# Patient Record
Sex: FEMALE | Race: WHITE | Employment: FULL TIME | URBAN - METROPOLITAN AREA
[De-identification: names, ages, dates, MRNs, and addresses within clinical notes are randomized per-mention and may not be internally consistent; named-entity substitution may affect disease eponyms.]

---

## 2019-04-12 ENCOUNTER — HOSPITAL ENCOUNTER (OUTPATIENT)
Dept: MRI IMAGING | Age: 24
Discharge: HOME | End: 2019-04-12
Attending: PODIATRIST
Payer: COMMERCIAL

## 2019-04-12 DIAGNOSIS — M95.8 OSTEOCHONDRAL DEFECT OF ANKLE: ICD-10-CM

## 2019-04-12 PROCEDURE — 73721 MRI JNT OF LWR EXTRE W/O DYE: CPT

## 2019-05-21 RX ORDER — HYOSCYAMINE SULFATE 0.12 MG/1
0.12 TABLET, ORALLY DISINTEGRATING ORAL
COMMUNITY

## 2019-05-21 RX ORDER — LANSOPRAZOLE 30 MG/1
30 CAPSULE, DELAYED RELEASE ORAL
COMMUNITY

## 2019-05-21 RX ORDER — ONDANSETRON HYDROCHLORIDE 8 MG/1
8 TABLET, FILM COATED ORAL
COMMUNITY

## 2019-05-21 RX ORDER — RANITIDINE 150 MG/1
150 TABLET, FILM COATED ORAL DAILY
COMMUNITY

## 2019-05-21 RX ORDER — ASPIRIN 81 MG/1
81 TABLET ORAL DAILY
COMMUNITY

## 2019-05-21 RX ORDER — NORETHINDRONE ACETATE AND ETHINYL ESTRADIOL 1; .02 MG/1; MG/1
1 TABLET ORAL DAILY
COMMUNITY

## 2019-05-21 NOTE — PERIOP NOTES
[facility]  Preoperative Instructions        Surgery Date 5/24/2019          Time of Arrival 0600    1. On the day of your surgery, please report to the Surgical Services Registration Desk and sign in at your designated time. The Surgery Center is located to the right of the Emergency Room. 2. You must have someone with you to drive you home. You should not drive a car for 24 hours following surgery. Please make arrangements for a friend or family member to stay with you for the first 24 hours after your surgery. 3. Do not have anything to eat or drink (including water, gum, mints, coffee, juice) after midnight 5/23/2019. ? This may not apply to medications prescribed by your physician. ?(Please note below the special instructions with medications to take the morning of your procedure.)    4. We recommend you do not drink any alcoholic beverages for 24 hours before and after your surgery. 5. Contact your surgeons office for instructions on the following medications: non-steroidal anti-inflammatory drugs (i.e. Advil, Aleve), vitamins, and supplements. (Some surgeons will want you to stop these medications prior to surgery and others may allow you to take them)  **If you are currently taking Plavix, Coumadin, Aspirin and/or other blood-thinning agents, contact your surgeon for instructions. ** Your surgeon will partner with the physician prescribing these medications to determine if it is safe to stop or if you need to continue taking. Please do not stop taking these medications without instructions from your surgeon    6. Wear comfortable clothes. Wear glasses instead of contacts. Do not bring any money or jewelry. Please bring picture ID, insurance card, and any prearranged co-payment or hospital payment. Do not wear make-up, particularly mascara the morning of your surgery. Do not wear nail polish, particularly if you are having foot /hand surgery.   Wear your hair loose or down, no ponytails, buns, millie pins or clips. All body piercings must be removed. Please shower with antibacterial soap for three consecutive days before and on the morning of surgery, but do not apply any lotions, powders or deodorants after the shower on the day of surgery. Please use a fresh towels after each shower. Please sleep in clean clothes and change bed linens the night before surgery. Please do not shave for 48 hours prior to surgery. Shaving of the face is acceptable. 7. You should understand that if you do not follow these instructions your surgery may be cancelled. If your physical condition changes (I.e. fever, cold or flu) please contact your surgeon as soon as possible. 8. It is important that you be on time. If a situation occurs where you may be late, please call [phone] (OR Holding Area). 9. If you have any questions and or problems, please call [phone] (Pre-admission Testing). 10. Your surgery time may be subject to change. You will receive a phone call the evening prior if your time changes. 11.  If having outpatient surgery, you must have someone to drive you here, stay with you during the duration of your stay, and to drive you home at time of discharge. 12.   In an effort to improve the efficiency, privacy, and safety for all of our Pre-op patients visitors are not allowed in the Holding area. Once you arrive and are registered your family/visitors will be asked to remain in the waiting room. The Pre-op staff will get you from the Surgical Waiting Area and will explain to you and your family/visitors that the Pre-op phase is beginning. The staff will answer any questions and provide instructions for tracking of the patient, by use of the existing tracking number and color-coded status board in the waiting room.   At this time the staff will also ask for your designated spokesperson information in the event that the physician or staff need to provide an update or obtain any pertinent information. The designated spokesperson will be notified if the physician needs to speak to family during the pre-operative phase. If at any time your family/visitors has questions or concerns they may approach the volunteer desk in the waiting area for assistance. Special Instructions: bring walking boot/crutches. MEDICATIONS TO TAKE THE MORNING OF SURGERY WITH A SIP OF WATER:  Zyrtec, Hyoscyamin, Prevacid, Junel, Zofran, Zantac, Nasacort. I understand a pre-operative phone call will be made to verify my surgery time. In the event that I am not available, I give permission for a message to be left on my answering service and/or with another person?   Yes, patient at [phone].         ___________________      __________   _________    Esequiel Pascal of Patient)             (Witness)                (Date and Time)

## 2019-05-21 NOTE — PERIOP NOTES
Pt states,\" I took my last dose of Eliquis this morning, and I will start Aspirin 81 mg once a day, that is what my doctor told me to do because I have a DVT in my left leg. He told me to take it everyday and even on the day of my surgery because of the DVT\". Advised pt to verify if she should take Aspirin on DOS.   Pt states she will recheck with MD.

## 2019-05-23 ENCOUNTER — ANESTHESIA EVENT (OUTPATIENT)
Dept: SURGERY | Age: 24
End: 2019-05-23
Payer: COMMERCIAL

## 2019-05-23 NOTE — PERIOP NOTES
Spoke to the patient and gave TOA of 730 and to come to [facility]. Reviewed pre op instructions. Patient verbalized understanding.

## 2019-05-24 ENCOUNTER — HOSPITAL ENCOUNTER (OUTPATIENT)
Age: 24
Setting detail: OUTPATIENT SURGERY
Discharge: HOME | End: 2019-05-24
Attending: PODIATRIST | Admitting: PODIATRIST
Payer: COMMERCIAL

## 2019-05-24 ENCOUNTER — ANESTHESIA (OUTPATIENT)
Dept: SURGERY | Age: 24
End: 2019-05-24
Payer: COMMERCIAL

## 2019-05-24 VITALS
HEIGHT: 61 IN | SYSTOLIC BLOOD PRESSURE: 123 MMHG | TEMPERATURE: 99.2 F | WEIGHT: 180 LBS | HEART RATE: 100 BPM | BODY MASS INDEX: 33.99 KG/M2 | OXYGEN SATURATION: 97 % | RESPIRATION RATE: 17 BRPM | DIASTOLIC BLOOD PRESSURE: 79 MMHG

## 2019-05-24 DIAGNOSIS — G89.18 POST-OP PAIN: Primary | ICD-10-CM

## 2019-05-24 LAB — HCG UR QL: NEGATIVE

## 2019-05-24 PROCEDURE — 74011250636 HC RX REV CODE- 250/636

## 2019-05-24 PROCEDURE — 76210000034 HC AMBSU PH I REC 0.5 TO 1 HR: Performed by: PODIATRIST

## 2019-05-24 PROCEDURE — 77030008684 HC TU ET CUF COVD -B: Performed by: NURSE ANESTHETIST, CERTIFIED REGISTERED

## 2019-05-24 PROCEDURE — 77030006898 HC BLD SHV SYNVTR S&N -B: Performed by: PODIATRIST

## 2019-05-24 PROCEDURE — 74011250636 HC RX REV CODE- 250/636: Performed by: ANESTHESIOLOGY

## 2019-05-24 PROCEDURE — 81025 URINE PREGNANCY TEST: CPT

## 2019-05-24 PROCEDURE — 74011000250 HC RX REV CODE- 250: Performed by: PODIATRIST

## 2019-05-24 PROCEDURE — 76060000063 HC AMB SURG ANES 1.5 TO 2 HR: Performed by: PODIATRIST

## 2019-05-24 PROCEDURE — 74011250636 HC RX REV CODE- 250/636: Performed by: PODIATRIST

## 2019-05-24 PROCEDURE — 77030026438 HC STYL ET INTUB CARD -A: Performed by: NURSE ANESTHETIST, CERTIFIED REGISTERED

## 2019-05-24 PROCEDURE — C1713 ANCHOR/SCREW BN/BN,TIS/BN: HCPCS | Performed by: PODIATRIST

## 2019-05-24 PROCEDURE — 74011250637 HC RX REV CODE- 250/637: Performed by: ANESTHESIOLOGY

## 2019-05-24 PROCEDURE — 77030003397 HC NDL ARTHSCP ARTH -C: Performed by: PODIATRIST

## 2019-05-24 PROCEDURE — 77030003666 HC NDL SPINAL BD -A: Performed by: PODIATRIST

## 2019-05-24 PROCEDURE — 77030018835 HC SOL IRR LR ICUM -A: Performed by: PODIATRIST

## 2019-05-24 PROCEDURE — 77030012711 HC WND ARTHRO ABLT S&N -D: Performed by: PODIATRIST

## 2019-05-24 PROCEDURE — 77030002916 HC SUT ETHLN J&J -A: Performed by: PODIATRIST

## 2019-05-24 PROCEDURE — 77030019908 HC STETH ESOPH SIMS -A: Performed by: NURSE ANESTHETIST, CERTIFIED REGISTERED

## 2019-05-24 PROCEDURE — 77030008496 HC TBNG ARTHSC IRR S&N -B: Performed by: PODIATRIST

## 2019-05-24 PROCEDURE — 76210000056 HC AMBSU PH II REC 1.5 TO 2 HR: Performed by: PODIATRIST

## 2019-05-24 PROCEDURE — 76030000003 HC AMB SURG OR TIME 1.5 TO 2: Performed by: PODIATRIST

## 2019-05-24 PROCEDURE — 77030018850 HC STOCK ANTIEMB THG COVD -A: Performed by: PODIATRIST

## 2019-05-24 PROCEDURE — 77030020255 HC SOL INJ LR 1000ML BG: Performed by: PODIATRIST

## 2019-05-24 DEVICE — KIT INSTR INTERNALBRACE LIG AUG REP CLLGN COAT FBR TAPE W/: Type: IMPLANTABLE DEVICE | Site: ANKLE | Status: FUNCTIONAL

## 2019-05-24 RX ORDER — HYDROMORPHONE HYDROCHLORIDE 1 MG/ML
INJECTION, SOLUTION INTRAMUSCULAR; INTRAVENOUS; SUBCUTANEOUS
Status: DISCONTINUED
Start: 2019-05-24 | End: 2019-05-24 | Stop reason: HOSPADM

## 2019-05-24 RX ORDER — SODIUM CHLORIDE, SODIUM LACTATE, POTASSIUM CHLORIDE, CALCIUM CHLORIDE 600; 310; 30; 20 MG/100ML; MG/100ML; MG/100ML; MG/100ML
25 INJECTION, SOLUTION INTRAVENOUS CONTINUOUS
Status: DISCONTINUED | OUTPATIENT
Start: 2019-05-24 | End: 2019-05-24 | Stop reason: HOSPADM

## 2019-05-24 RX ORDER — LIDOCAINE HYDROCHLORIDE 10 MG/ML
0.1 INJECTION, SOLUTION EPIDURAL; INFILTRATION; INTRACAUDAL; PERINEURAL AS NEEDED
Status: DISCONTINUED | OUTPATIENT
Start: 2019-05-24 | End: 2019-05-24 | Stop reason: HOSPADM

## 2019-05-24 RX ORDER — SODIUM CHLORIDE 0.9 % (FLUSH) 0.9 %
5-40 SYRINGE (ML) INJECTION AS NEEDED
Status: DISCONTINUED | OUTPATIENT
Start: 2019-05-24 | End: 2019-05-24 | Stop reason: HOSPADM

## 2019-05-24 RX ORDER — SUCCINYLCHOLINE CHLORIDE 20 MG/ML
INJECTION INTRAMUSCULAR; INTRAVENOUS AS NEEDED
Status: DISCONTINUED | OUTPATIENT
Start: 2019-05-24 | End: 2019-05-24 | Stop reason: HOSPADM

## 2019-05-24 RX ORDER — DEXAMETHASONE SODIUM PHOSPHATE 4 MG/ML
INJECTION, SOLUTION INTRA-ARTICULAR; INTRALESIONAL; INTRAMUSCULAR; INTRAVENOUS; SOFT TISSUE AS NEEDED
Status: DISCONTINUED | OUTPATIENT
Start: 2019-05-24 | End: 2019-05-24 | Stop reason: HOSPADM

## 2019-05-24 RX ORDER — SODIUM CHLORIDE 0.9 % (FLUSH) 0.9 %
5-40 SYRINGE (ML) INJECTION EVERY 8 HOURS
Status: DISCONTINUED | OUTPATIENT
Start: 2019-05-24 | End: 2019-05-24 | Stop reason: HOSPADM

## 2019-05-24 RX ORDER — OXYCODONE AND ACETAMINOPHEN 7.5; 325 MG/1; MG/1
1 TABLET ORAL
Qty: 30 TAB | Refills: 0 | Status: SHIPPED | OUTPATIENT
Start: 2019-05-24 | End: 2019-05-27

## 2019-05-24 RX ORDER — FENTANYL CITRATE 50 UG/ML
INJECTION, SOLUTION INTRAMUSCULAR; INTRAVENOUS AS NEEDED
Status: DISCONTINUED | OUTPATIENT
Start: 2019-05-24 | End: 2019-05-24 | Stop reason: HOSPADM

## 2019-05-24 RX ORDER — PROPOFOL 10 MG/ML
INJECTION, EMULSION INTRAVENOUS AS NEEDED
Status: DISCONTINUED | OUTPATIENT
Start: 2019-05-24 | End: 2019-05-24 | Stop reason: HOSPADM

## 2019-05-24 RX ORDER — CEFAZOLIN SODIUM/WATER 2 G/20 ML
SYRINGE (ML) INTRAVENOUS
Status: COMPLETED
Start: 2019-05-24 | End: 2019-05-24

## 2019-05-24 RX ORDER — ONDANSETRON 2 MG/ML
INJECTION INTRAMUSCULAR; INTRAVENOUS AS NEEDED
Status: DISCONTINUED | OUTPATIENT
Start: 2019-05-24 | End: 2019-05-24 | Stop reason: HOSPADM

## 2019-05-24 RX ORDER — CEFAZOLIN SODIUM/WATER 2 G/20 ML
2 SYRINGE (ML) INTRAVENOUS
Status: COMPLETED | OUTPATIENT
Start: 2019-05-24 | End: 2019-05-24

## 2019-05-24 RX ORDER — DIPHENHYDRAMINE HYDROCHLORIDE 50 MG/ML
12.5 INJECTION, SOLUTION INTRAMUSCULAR; INTRAVENOUS AS NEEDED
Status: DISCONTINUED | OUTPATIENT
Start: 2019-05-24 | End: 2019-05-24 | Stop reason: HOSPADM

## 2019-05-24 RX ORDER — OXYCODONE AND ACETAMINOPHEN 5; 325 MG/1; MG/1
1 TABLET ORAL
Status: DISCONTINUED | OUTPATIENT
Start: 2019-05-24 | End: 2019-05-24 | Stop reason: HOSPADM

## 2019-05-24 RX ORDER — MORPHINE SULFATE 10 MG/ML
2 INJECTION, SOLUTION INTRAMUSCULAR; INTRAVENOUS
Status: DISCONTINUED | OUTPATIENT
Start: 2019-05-24 | End: 2019-05-24 | Stop reason: HOSPADM

## 2019-05-24 RX ORDER — BUPIVACAINE HYDROCHLORIDE AND EPINEPHRINE 5; 5 MG/ML; UG/ML
INJECTION, SOLUTION EPIDURAL; INTRACAUDAL; PERINEURAL AS NEEDED
Status: DISCONTINUED | OUTPATIENT
Start: 2019-05-24 | End: 2019-05-24 | Stop reason: HOSPADM

## 2019-05-24 RX ORDER — PROMETHAZINE HYDROCHLORIDE 25 MG/1
25 TABLET ORAL
Qty: 25 TAB | Refills: 0 | Status: SHIPPED | OUTPATIENT
Start: 2019-05-24

## 2019-05-24 RX ORDER — HYDROMORPHONE HYDROCHLORIDE 2 MG/ML
INJECTION, SOLUTION INTRAMUSCULAR; INTRAVENOUS; SUBCUTANEOUS AS NEEDED
Status: DISCONTINUED | OUTPATIENT
Start: 2019-05-24 | End: 2019-05-24 | Stop reason: HOSPADM

## 2019-05-24 RX ORDER — HYDROMORPHONE HYDROCHLORIDE 1 MG/ML
.2-.5 INJECTION, SOLUTION INTRAMUSCULAR; INTRAVENOUS; SUBCUTANEOUS ONCE
Status: DISCONTINUED | OUTPATIENT
Start: 2019-05-24 | End: 2019-05-24 | Stop reason: HOSPADM

## 2019-05-24 RX ORDER — MIDAZOLAM HYDROCHLORIDE 1 MG/ML
INJECTION, SOLUTION INTRAMUSCULAR; INTRAVENOUS AS NEEDED
Status: DISCONTINUED | OUTPATIENT
Start: 2019-05-24 | End: 2019-05-24 | Stop reason: HOSPADM

## 2019-05-24 RX ORDER — LIDOCAINE HYDROCHLORIDE 20 MG/ML
INJECTION, SOLUTION EPIDURAL; INFILTRATION; INTRACAUDAL; PERINEURAL AS NEEDED
Status: DISCONTINUED | OUTPATIENT
Start: 2019-05-24 | End: 2019-05-24 | Stop reason: HOSPADM

## 2019-05-24 RX ORDER — ACETAMINOPHEN 10 MG/ML
1000 INJECTION, SOLUTION INTRAVENOUS ONCE
Status: COMPLETED | OUTPATIENT
Start: 2019-05-24 | End: 2019-05-24

## 2019-05-24 RX ORDER — SCOLOPAMINE TRANSDERMAL SYSTEM 1 MG/1
1 PATCH, EXTENDED RELEASE TRANSDERMAL ONCE
Status: DISCONTINUED | OUTPATIENT
Start: 2019-05-24 | End: 2019-05-24 | Stop reason: HOSPADM

## 2019-05-24 RX ORDER — FENTANYL CITRATE 50 UG/ML
25 INJECTION, SOLUTION INTRAMUSCULAR; INTRAVENOUS
Status: DISCONTINUED | OUTPATIENT
Start: 2019-05-24 | End: 2019-05-24 | Stop reason: HOSPADM

## 2019-05-24 RX ADMIN — Medication 2 G: at 09:41

## 2019-05-24 RX ADMIN — DEXAMETHASONE SODIUM PHOSPHATE 4 MG: 4 INJECTION, SOLUTION INTRA-ARTICULAR; INTRALESIONAL; INTRAMUSCULAR; INTRAVENOUS; SOFT TISSUE at 09:45

## 2019-05-24 RX ADMIN — MIDAZOLAM HYDROCHLORIDE 2 MG: 1 INJECTION, SOLUTION INTRAMUSCULAR; INTRAVENOUS at 09:28

## 2019-05-24 RX ADMIN — MEPERIDINE HYDROCHLORIDE 12.5 MG: 25 INJECTION, SOLUTION INTRAMUSCULAR; INTRAVENOUS; SUBCUTANEOUS at 11:41

## 2019-05-24 RX ADMIN — FENTANYL CITRATE 25 MCG: 50 INJECTION, SOLUTION INTRAMUSCULAR; INTRAVENOUS at 10:54

## 2019-05-24 RX ADMIN — FENTANYL CITRATE 25 MCG: 50 INJECTION, SOLUTION INTRAMUSCULAR; INTRAVENOUS at 10:56

## 2019-05-24 RX ADMIN — SUCCINYLCHOLINE CHLORIDE 140 MG: 20 INJECTION INTRAMUSCULAR; INTRAVENOUS at 09:34

## 2019-05-24 RX ADMIN — SODIUM CHLORIDE, SODIUM LACTATE, POTASSIUM CHLORIDE, AND CALCIUM CHLORIDE 25 ML/HR: 600; 310; 30; 20 INJECTION, SOLUTION INTRAVENOUS at 11:59

## 2019-05-24 RX ADMIN — MEPERIDINE HYDROCHLORIDE 12.5 MG: 25 INJECTION, SOLUTION INTRAMUSCULAR; INTRAVENOUS; SUBCUTANEOUS at 11:28

## 2019-05-24 RX ADMIN — LIDOCAINE HYDROCHLORIDE 60 MG: 20 INJECTION, SOLUTION EPIDURAL; INFILTRATION; INTRACAUDAL; PERINEURAL at 09:34

## 2019-05-24 RX ADMIN — ACETAMINOPHEN 1000 MG: 10 INJECTION, SOLUTION INTRAVENOUS at 08:26

## 2019-05-24 RX ADMIN — HYDROMORPHONE HYDROCHLORIDE 0.5 MG: 2 INJECTION, SOLUTION INTRAMUSCULAR; INTRAVENOUS; SUBCUTANEOUS at 09:27

## 2019-05-24 RX ADMIN — PROPOFOL 250 MG: 10 INJECTION, EMULSION INTRAVENOUS at 09:34

## 2019-05-24 RX ADMIN — SODIUM CHLORIDE, SODIUM LACTATE, POTASSIUM CHLORIDE, AND CALCIUM CHLORIDE 25 ML/HR: 600; 310; 30; 20 INJECTION, SOLUTION INTRAVENOUS at 08:18

## 2019-05-24 RX ADMIN — FENTANYL CITRATE 50 MCG: 50 INJECTION, SOLUTION INTRAMUSCULAR; INTRAVENOUS at 09:58

## 2019-05-24 RX ADMIN — ONDANSETRON 4 MG: 2 INJECTION INTRAMUSCULAR; INTRAVENOUS at 09:27

## 2019-05-24 RX ADMIN — HYDROMORPHONE HYDROCHLORIDE 0.5 MG: 2 INJECTION, SOLUTION INTRAMUSCULAR; INTRAVENOUS; SUBCUTANEOUS at 09:43

## 2019-05-24 NOTE — PERIOP NOTES
Permission received to review discharge instructions and discuss private health information with Sienna Perez. Adi Dozier brought to bedside. 1249-Pt tolerating liquids, Adi Dozier updated on pt's status. VSS. D/c instructions reviewed, with fiance, all questions answered. Reviewed hygiene care, when to take pain meds, when to call the doctor, when to call 911, how to do leg exercises, when to restart eliquis, how to use the sequential machine at home. Pt given a work note. 1314-Pt resting,tolerating water w/o difficulty. VSS. Pt receiving more iv fluids due to dizziness and nausea when sitting up. Pt given quease ease, on oxygen and receiving iv fluids. 1345- Pt voided x 1, on and off nausea while getting dressed. Placed sequential for home use, not working properly so will use other one from rep once in vehicle. 1354-Transported pt via w/c to awaiting transportation. Discussed to try other sequential , if continues to beep, go by Dr Gupta Carrier office and switch out since pt lives approximately 2 hours away. Pt given vomit bags and extra quease ease. Pt and fiance informed to check with pcp about aspirin resumption tomorrow since pcp had started pt  On aspirin since pt could not take elequis. Pt given crackers and encouraged to keep diet light on way home. Pt reporting pain is minimal in L ankle.

## 2019-05-24 NOTE — BRIEF OP NOTE
BRIEF OPERATIVE NOTE    Date of Procedure: 5/24/2019   Preoperative Diagnosis: CAPSULITIS LEFT ANKLE  Postoperative Diagnosis: CAPSULITIS LEFT ANKLE LOOSE BODY AND ANKLE INSTABILITY   Procedure(s):  ARTHROSCOPIC DEBRIDEMENT LEFT ANKLE, arthroscopic stabilization of  left ankle   Surgeon(s) and Role:     Daniel Elizondo DPM - Primary         Surgical Assistant: none    Surgical Staff:  Circ-1: Karon Kruse RN  Scrub Tech-1: Abdi Vicente  Event Time In Time Out   Incision Start 0540    Incision Close 1106      Anesthesia: General   Estimated Blood Loss: 5cc  Specimens: * No specimens in log *   Findings: unstable ankle and loose body   Complications: none  Implants:   Implant Name Type Inv.  Item Serial No.  Lot No. LRB No. Used Action   KIT LIGMNT REP AUG -- INTERNALBRACE - SNA  KIT LIGMNT REP AUG -- INTERNALBRACE NA ARTHREX [id number] 1 Implanted

## 2019-05-24 NOTE — ANESTHESIA POSTPROCEDURE EVALUATION
Procedure(s):  ARTHROSCOPIC DEBRIDEMENT LEFT ANKLE, arthroscopic stabilization of  left ankle .     general    Anesthesia Post Evaluation      Multimodal analgesia: multimodal analgesia used between 6 hours prior to anesthesia start to PACU discharge  Patient location during evaluation: bedside  Patient participation: complete - patient participated  Level of consciousness: awake and alert  Pain score: 2  Airway patency: patent  Anesthetic complications: no  Cardiovascular status: acceptable  Respiratory status: acceptable  Hydration status: acceptable  Post anesthesia nausea and vomiting:  controlled      Vitals Value Taken Time   /76 5/24/2019 12:10 PM   Temp 37.3 °C (99.2 °F) 5/24/2019 12:10 PM   Pulse 105 5/24/2019 12:10 PM   Resp 15 5/24/2019 12:10 PM   SpO2 99 % 5/24/2019 12:10 PM

## 2019-05-24 NOTE — OP NOTES
[address]  OPERATIVE REPORT    Name:  Cassi Sloan  MR#:  [id number]  :  1995  ACCOUNT #:  [de-identified]  DATE OF SERVICE:  2019    PREOPERATIVE DIAGNOSIS:  Capsulitis of the left ankle. POSTOPERATIVE DIAGNOSIS:  Capsulitis, left ankle with loose body and instability. PROCEDURE PERFORMED:  Arthroscopic debridement extensive in nature of the left ankle with arthroscopic ankle stabilization. SURGEON:  Danielle Arora DPM    ASSISTANT:  none. ANESTHESIA:  General.    COMPLICATIONS:  None. SPECIMENS REMOVED:  none. IMPLANTS:  Arthrex internal brace. ESTIMATED BLOOD LOSS:  5 mL. PATHOLOGY:  None. INDICATIONS:  I spoke to the patient about the procedure. She said she sprained her ankle a few times and it is getting worse and more unstable. We have agreed that we would go in there and inspect the ankle and debride it and then if there were any issues, I would stabilize it. When I went in there, I immediately noted there was an impingement lesion along the lateral aspect of the left ankle, this was debrided out. There was hypertrophy of the anterior and inferior tib-fib ligament, this was also debrided back to a normal healthy ligament. Once I had accomplished both of these two maneuvers, we did a stress inversion arthroscopically and noted an increase in inversion of the ankle. At the anterior talofibular ligament, there was a loose piece of bone probably from an avulsion from a previous sprain and this was excised out totally. At this point, I roughened up the lateral aspect of the fibula; I turned the internal brace into the fibula and then put the internal brace into the talus at the insertion of the anterior talofibular with a 3.5 anchor as well. This was stable. The inversion was repaired. I then took the remaining suture from the internal brace.   I did a Scorpion and grabbed the soft tissues from the lateral aspect of the ankle capsule and broached in the lateral capsule into the fibula with a [place] slider knot. Once I had created all of this, the ankle was cleaned. The portals were closed with a 4-0 nylon. She was injected with dexamethasone and Marcaine and left the operating room to the recovery room after application of a posterior splint.       MAMIE Mcbride/[id number]  D:  05/24/2019 11:26  T:  05/24/2019 12:03  JOB #:  [id number]

## 2019-05-24 NOTE — ANESTHESIA PREPROCEDURE EVALUATION
Relevant Problems   No relevant active problems       Anesthetic History     PONV          Review of Systems / Medical History  Patient summary reviewed, nursing notes reviewed and pertinent labs reviewed    Pulmonary                Comments: Sinus allergies   Neuro/Psych         Psychiatric history (anxiety)     Cardiovascular                  Exercise tolerance: >4 METS  Comments: Current DVT of left leg due to injury; stopped Eliquis 3 day ago   GI/Hepatic/Renal     GERD: poorly controlled      Liver disease (fatty)     Endo/Other        Obesity     Other Findings   Comments: Rare motion sickness & vertigo         Physical Exam    Airway  Mallampati: I  TM Distance: > 6 cm  Neck ROM: normal range of motion   Mouth opening: Normal     Cardiovascular    Rhythm: regular  Rate: normal         Dental  No notable dental hx       Pulmonary  Breath sounds clear to auscultation               Abdominal  GI exam deferred       Other Findings            Anesthetic Plan    ASA: 2  Anesthesia type: general          Induction: Intravenous  Anesthetic plan and risks discussed with: Patient      Scopolamine patch preop

## 2019-05-24 NOTE — PERIOP NOTES
Royce Suárez  1995  [id number]    Situation:  Verbal report given from: Marcell Jones CRNA, Gopal Hamm RN  Procedure: Procedure(s):  ARTHROSCOPIC DEBRIDEMENT LEFT ANKLE, arthroscopic stabilization of  left ankle     Background:    Preoperative diagnosis: CAPSULITIS LEFT ANKLE    Postoperative diagnosis: CAPSULITIS LEFT ANKLE    :  Dr. Nikhil Cancino    Assistant(s): Circ-1: Allyssa Santana RN  Scrub Tech-1: Arlette GARCIA    Specimens: * No specimens in log *    Assessment:  Intra-procedure medications         Anesthesia gave intra-procedure sedation and medications, see anesthesia flow sheet     Intravenous fluids: LR@ KVO     Vital signs stable       Recommendation:

## 2019-05-24 NOTE — DISCHARGE INSTRUCTIONS
>>>You received an IV form of Tylenol 1000mg (Ofirmev) during your surgery, you may take tylenol (or pain medication containing Tylenol or Acetaminophen) in 4 hours at 1230.<<<        INSTRUCTIONS FOLLOWING FOOT SURGERY    ACTIVITY:  · Elevate feet for 48 hours  · Use ice as directed by your doctor  · Use crutches / walker as directed by your doctor, and follow your doctors instructions as to your weight bearing status - NO WEIGHT ON THE SURGICAL SIDE    DIET:  · Clear liquids until no nausea or vomiting; then light diet for the first day  · An advance to regular diet on second day, unless your doctor orders otherwise. PAIN:  · Take pain medication as directed by your doctor. · Call your doctor if pain is not relieved by medication. · Do not take aspirin or blood thinners until directed by your doctor. DRESSING CARE: keep dressing clean and dry, do not remove       FOLLOW-UP PHONE CALLS:  · Calls will be made by nursing staff. · If you had any problems, call your doctor as needed. CALL YOUR DOCTOR IF:  · Excessive bleeding that does not stop after holding mild pressure over the area  · Temperature of 101° F or above  · Redness, excessive swelling or bruising, and/or green or yellow, smelly discharge from incision  · Loss of sensation -cold, white, or blue toes    AFTER ANESTHESIA :   · For the first 24 hours: do not drive, drink alcoholic beverages, or make important decisions. · Be aware of dizziness following anesthesia and while taking pain medication. DISCHARGE MEDICATIONS:         APPOINTMENT DATE/TIME: please call for an appointment    YOUR [address] PHONE NUMBER: [phone]    Patients signature:  Date: 5/24/2019  Discharging Nurse:       TO PREVENT AN INFECTION      1. [address] YOUR HANDS     To prevent infection, good handwashing is the most important thing you or your caregiver can do.        Wash your hands with soap and water or use the hand  we gave you before you touch any wounds. 2. SHOWER     Use the antibacterial soap we gave you when you take a shower.  Shower with this soap until your wounds are healed.  To reach all areas of your body, you may need someone to help you.  Dont forget to clean your belly button with every shower. 3.  USE CLEAN SHEETS     Use freshly cleaned sheets on your bed after surgery.  To keep the surgery site clean, do not allow pets to sleep with you while your wound is still healing. 4. STOP SMOKING     Stop smoking, or at least cut back on smoking     Smoking slows your healing. 5.  CONTROL YOUR BLOOD SUGAR     High blood sugars slow wound healing.  If you are diabetic, control your blood sugar levels before and after your surgery. DO NOT TAKE TYLENOL/ACETAMINOPHEN WITH PERCOCET, [facility]ORT[facility], [address] TAKE NARCOTIC PAIN MEDICATIONS WITH FOOD     Narcotics tend to be constipating, we suggest taking a stool softener such as Colace or Miralax (follow package instructions). DO NOT DRIVE WHILE TAKING NARCOTIC PAIN MEDICATIONS. DO NOT TAKE SLEEPING MEDICATIONS OR ANTIANXIETY MEDICATIONS WHILE TAKING NARCOTIC PAIN MEDICATIONS,  ESPECIALLY THE NIGHT OF ANESTHESIA! CPAP PATIENTS BE SURE TO WEAR MACHINE WHENEVER NAPPING OR SLEEPING! DISCHARGE SUMMARY from Nurse    The following personal items collected during your admission are returned to you:   Dental Appliance: Dental Appliances: None  Vision: Visual Aid: Glasses, With patient  Hearing Aid:    Jewelry: Jewelry: None  Clothing: Clothing: With patient, Shirt, Undergarments, Footwear, Pants  Other Valuables: Other Valuables: None  Valuables sent to safe:        PATIENT INSTRUCTIONS:    After General Anesthesia or Intravenous Sedation, for 24 hours or while taking prescription Narcotics:        Someone should be with you for the next 24 hours. For your own safety, a responsible adult must drive you home.   · Limit your activities  · Recommended activity: Rest today, up with assistance today. Do not climb stairs or shower unattended for the next 24 hours. · Please start with a soft bland diet and advance as tolerated (no nausea) to regular diet. · If you have a sore throat you should try the following: fluids, warm salt water gargles, or throat lozenges. If it does not improve after several days please follow up with your primary physician. · Do not drive and operate hazardous machinery  · Do not make important personal or business decisions  · Do  not drink alcoholic beverages  · If you have not urinated within 8 hours after discharge, please contact your surgeon on call. Report the following to your surgeon:  · Excessive pain, swelling, redness or odor of or around the surgical area  · Temperature over 100.5  · Nausea and vomiting lasting longer than 4 hours or if unable to take medications  · Any signs of decreased circulation or nerve impairment to extremity: change in color, persistent  numbness, tingling, coldness or increase pain      · You will receive a Post Operative Call from one of the Recovery Room Nurses on the day after your surgery to check on you. It is very important for us to know how you are recovering after your surgery. If you have an issue or need to speak with someone, please call your surgeon, do not wait for the post operative call. · You may receive an e-mail or letter in the mail from [place] regarding your experience with us in the Ambulatory Surgery Unit. Your feedback is valuable to us and we appreciate your participation in the survey. · If the above instructions are not adequate or you are having problems after your surgery, call the physician at their office number. · We wish you a speedy recovery ? What to do at Home:      *  Please give a list of your current medications to your Primary Care Provider.     *  Please update this list whenever your medications are discontinued, doses are      changed, or new medications (including over-the-counter products) are added. *  Please carry medication information at all times in case of emergency situations. These are general instructions for a healthy lifestyle:    No smoking/ No tobacco products/ Avoid exposure to second hand smoke    Surgeon General's Warning:  Quitting smoking now greatly reduces serious risk to your health. Obesity, smoking, and sedentary lifestyle greatly increases your risk for illness    A healthy diet, regular physical exercise & weight monitoring are important for maintaining a healthy lifestyle    You may be retaining fluid if you have a history of heart failure or if you experience any of the following symptoms:  Weight gain of 3 pounds or more overnight or 5 pounds in a week, increased swelling in our hands or feet or shortness of breath while lying flat in bed. Please call your doctor as soon as you notice any of these symptoms; do not wait until your next office visit. Recognize signs and symptoms of STROKE:    B - Balance  E - Eyes    F-  Face looks uneven  A-  Arms unable to move or move even  S-  Speech slurred or non-existent  T-  Time-call 911 as soon as signs and symptoms begin-DO NOT go       Back to bed or wait to see if you get better-TIME IS BRAIN. If you have not received your influenza and/or pneumococcal vaccine, please follow up with your primary care physician. The discharge information has been reviewed with the patient and caregiver. The patient and caregiver verbalized understanding. Rm Gandaraian THROMBOSIS AND PULMONARY EMBOLUS    SURGICAL PATIENTS  Surgical patients are the #1 risk for DVT and PE. WHAT IS DVT? WHAT IS PE?  DVT is a serious condition where blood clots develop deep in the veins of the legs.   PE occurs when a blood clot breaks loose from the wall of a vein and travels to the lungs blocking the pulmonary artery or one of its branches impairing blood flow from the heart, which could result in death. RISK FACTORS   Surgery lasting longer than 45 minutes   History of inflammatory bowel disease   Oral contraceptive or hormone replacement therapy   Immobilization   Varicose veins / swollen legs   Smoking    CHF / Acute MI / Irregular heart beat   Family history of thrombosis   General anesthesia greater than 2 hours   Obesity   Infection of less than one month   Less than 1 month postpartum   COPD / Pneumonia   Arthroscopic surgery   Malignancy / cancer   Spine surgery   Blood abnormalities   Stroke / Paralysis / Coma    SIGNS AND SYMPTOMS OF DEEP VEIN TROMBOSIS  Usually occurs in one leg, above or below the knee   Swelling - one calf or thigh may be larger than the other   Feeling increased warmth in the area of the leg that is swollen or painful   Leg pain, which may increase when standing or walking   Swelling along the vein of the leg   When swollen areas is pressed with a finger, a depression may remain   Tenderness of the leg that may be confined to one area   Change in leg color (bluish or red)    SIGNS AND SYMPTOMS OF PULMONARY EMBOLUS   Chest pain that gets worse with deep breath, coughing or chest movement   Coughing up blood   Sweating   Shortness of breath or difficulty breathing   Rapid heart beat   Lightheadedness    HOW TO REDUCE THE POSSIBLE RISK OF DVT   Exercise - simple activities as rotating ankles and wrists, wiggling toes and fingers, tightening and relaxing muscles in calves and thighs promotes circulation while recovering from surgery. Please do these exercises every hour during waking hours   KIANA hose - If you have been given white support hose while having surgery, wear them home. You may remove them for half an hour every 8-hour period and stop wearing them 48 hours after surgery or as prescribed by your physician.  KIANA hose may be reused for air travel or extended car travel   Take mediation as prescribed by your physician (Lovenox, Coumadin, Aspirin)   Resume your normal activities as soon as your doctor advises you to do so.  Remember, when traveling, to [place] your legs frequently. Wear non skid shoes when KIANA hose are on. They are very slippery!     PATIENTS WHO BELIEVE THEY MAY BE EXPERIENCING SIGNS AND SYMPTOMS OF DVT OR PE SHOULD SEEK MEDICAL HELP IMMEDIATELY

## 2019-05-28 NOTE — H&P
Foot and Ankle Surgery History and Physical    Subjective:      Terri Solomon is a 21 y.o. female who presents for evaluation of arthroscopy of the ankle following an injury   She has pain and instasbility    Past Medical History:   Diagnosis Date    Adverse effect of anesthesia 06/2018    \"Woke up screaming\"    GERD (gastroesophageal reflux disease)     and Gastritis    Ill-defined condition     IBS    Liver disease 01/2019    fatty deposit on liver, LFTs are normal as of 1/2019    Other ill-defined conditions(799.89)     sinus allergies    Psychiatric disorder     Anxiety    Thromboembolus ([address] 05/08/2019    left leg after injury DVT     Past Surgical History:   Procedure Laterality Date    ABDOMEN SURGERY PROC UNLISTED  2011    gallbladder    HX HEENT  1999    tonsillectomy    HX HEENT      wisdom teeth removed    HX ORTHOPAEDIC Right 2010, 2011, 2012    right ankle surgery x3      Family History   Problem Relation Age of Onset    Thyroid Disease Mother     Hypertension Father     Diabetes Father     Depression Father      Social History     Tobacco Use    Smoking status: Never Smoker    Smokeless tobacco: Never Used   Substance Use Topics    Alcohol use: Yes     Alcohol/week: 1.2 oz     Types: 1 Glasses of wine, 1 Shots of liquor per week     Comment: socially weekends      Prior to Admission medications    Medication Sig Start Date End Date Taking? Authorizing Provider   oxyCODONE-acetaminophen (PERCOCET 7.5) 7.5-325 mg per tablet Take 1 Tab by mouth every four (4) hours as needed for Pain for up to 3 days. Max Daily Amount: 6 Tabs. 5/24/19 5/27/19 Yes Martinez Mast DPM   promethazine (PHENERGAN) 25 mg tablet Take 1 Tab by mouth every six (6) hours as needed for Nausea. Indications:  Additional Medications to Treat Pain, Nausea and Vomiting After Surgery 5/24/19  Yes Martinez Mast DPM   norethindrone-ethinyl estradiol (JUNEL 1/20, 21,) 1-20 mg-mcg tab Take 1 Tab by mouth daily. Yes Provider, Historical   triamcinolone acetonide (NASACORT AQ NA) 2 Sprays by Nasal route daily. Yes Provider, Historical   apixaban (ELIQUIS) 5 mg tablet Take 5 mg by mouth two (2) times a day. Indications: blood clot in a deep vein of the extremities   Yes Provider, Historical   aspirin delayed-release 81 mg tablet Take 81 mg by mouth daily. Yes Provider, Historical   lansoprazole (PREVACID) 30 mg capsule Take 30 mg by mouth Daily (before breakfast). Yes Provider, Historical   raNITIdine (ZANTAC) 150 mg tablet Take 150 mg by mouth daily. Yes Provider, Historical   ondansetron hcl (ZOFRAN) 8 mg tablet Take 8 mg by mouth every eight (8) hours as needed for Nausea. Yes Provider, Historical   hyoscyamine sulfate (CYSTOSPAZ) 0.125 mg tablet Take 0.125 mg by mouth every four (4) hours as needed. Yes Provider, Historical   cetirizine (ZYRTEC) 10 mg tablet Take 10 mg by mouth daily. Indications: SEASONAL ALLERGIC RHINITIS   Yes Provider, Historical      Allergies   Allergen Reactions    Other Medication Itching     FRESH FRUIT allergies, pt states \"I think I grew out of that\". Allergy testing this past year was negative. Review of Systems:  A comprehensive review of systems was negative except for that written in the History of Present Illness. Objective:      No data found. No data recorded. Physical Exam:  Pain at the ankle with increased stress inversion    Assessment:     capsulits - impingement syndrome and instabiblity    Plan:     Discussed the risk of surgery including infection  Continued pain  DVT / PE,  and the risks of general anesthetic. The patient understands the risks; any and all questions were answered to the patient's satisfaction.     Signed By: Connie Swift DPM     May 28, 2019

## (undated) DEVICE — BANDAGE,GAUZE,BULKEE II,4.5"X4.1YD,STRL: Brand: MEDLINE

## (undated) DEVICE — SYR LR LCK 1ML GRAD NSAF 30ML --

## (undated) DEVICE — SUPER TURBOVAC 90 INTEGRATED CABLE WAND ICW: Brand: COBLATION

## (undated) DEVICE — SYR 10ML LUER LOK 1/5ML GRAD --

## (undated) DEVICE — PADDING CAST 3 INX4 YD STRL

## (undated) DEVICE — 3M™ TEGADERM™ TRANSPARENT FILM DRESSING FRAME STYLE, 1624W, 2-3/8 IN X 2-3/4 IN (6 CM X 7 CM), 100/CT 4CT/CASE: Brand: 3M™ TEGADERM™

## (undated) DEVICE — CONTINU-FLO SOLUTION SET, 2 INJECTION SITES, MALE LUER LOCK ADAPTER WITH RETRACTABLE COLLAR, LARGE BORE STOPCOCK WITH ROTATING MALE LUER LOCK EXTENSION SET, 2 INJECTION SITES, MALE LUER LOCK ADAPTER WITH RETRACTABLE COLLAR: Brand: INTERLINK/CONTINU-FLO

## (undated) DEVICE — ANTI-EMBOLISM STOCKINGS,KNEE LENGTH,LARGE,REGULAR,SIZE E-: Brand: T.E.D.

## (undated) DEVICE — Z DISCONTINUED PER MEDLINE (LOW STOCK)  USE 2422770 DRAPE C ARM W54XL78IN FOR FLROSCN

## (undated) DEVICE — SOLUTION LACTATED RINGERS INJECTION USP

## (undated) DEVICE — KENDALL DL ECG CABLE AND LEAD WIRE SYSTEM, 5-LEAD, SINGLE PATIENT USE: Brand: KENDALL

## (undated) DEVICE — COVER LT HNDL PLAS RIG 1 PER PK

## (undated) DEVICE — NEEDLE HYPO 25GA L1.5IN BVL ORIENTED ECLIPSE

## (undated) DEVICE — KNEE ARTHROSCOPY IV-LF: Brand: MEDLINE INDUSTRIES, INC.

## (undated) DEVICE — 4-PORT MANIFOLD: Brand: NEPTUNE 2

## (undated) DEVICE — 4.5 MM SYNOVATOR STRAIGHT BLADES,                                    POWER/EP-1, FOREST GREEN, PACKAGED 6                                    PER BOX, STERILE

## (undated) DEVICE — DYONICS 25 INFLOW TUBE SET, 3 PER BOX

## (undated) DEVICE — STERILE POLYISOPRENE POWDER-FREE SURGICAL GLOVES WITH EMOLLIENT COATING: Brand: PROTEXIS

## (undated) DEVICE — DRESSING PETRO W3XL8IN N ADH OIL EMUL GZ CURAD

## (undated) DEVICE — SOLUTION IRRIG 3000ML LAC R FLX CONT

## (undated) DEVICE — NDL SPNE QNCKE 18GX3.5IN LF --

## (undated) DEVICE — NEEDLE SUT PASS FOR ROT CUF LABRAL REP SUREFIRE SCORPION

## (undated) DEVICE — BANDAGE,GAUZE,CONFORMING,3"X75",STRL,LF: Brand: MEDLINE

## (undated) DEVICE — PADDING CST CRMPD 3INX4YD NS --

## (undated) DEVICE — SPONGE GZ W4XL4IN COT 12 PLY TYP VII WVN C FLD DSGN

## (undated) DEVICE — (D)PREP SKN CHLRAPRP APPL 26ML -- CONVERT TO ITEM 371833

## (undated) DEVICE — SUT ETHLN 4-0 18IN PS2 BLK --

## (undated) DEVICE — INFECTION CONTROL KIT SYS

## (undated) DEVICE — SYR 5ML 1/5 GRAD LL NSAF LF --